# Patient Record
Sex: FEMALE | Race: WHITE | URBAN - METROPOLITAN AREA
[De-identification: names, ages, dates, MRNs, and addresses within clinical notes are randomized per-mention and may not be internally consistent; named-entity substitution may affect disease eponyms.]

---

## 2021-02-19 ENCOUNTER — OFFICE VISIT (OUTPATIENT)
Dept: NEUROLOGY | Age: 86
End: 2021-02-19
Payer: MEDICARE

## 2021-02-19 VITALS — WEIGHT: 112.2 LBS | DIASTOLIC BLOOD PRESSURE: 64 MMHG | HEART RATE: 52 BPM | SYSTOLIC BLOOD PRESSURE: 110 MMHG

## 2021-02-19 DIAGNOSIS — R27.0 ATAXIA: ICD-10-CM

## 2021-02-19 DIAGNOSIS — R55 SYNCOPE AND COLLAPSE: ICD-10-CM

## 2021-02-19 DIAGNOSIS — G45.9 TRANSIENT ISCHEMIC ATTACK: Primary | ICD-10-CM

## 2021-02-19 PROBLEM — Z87.09 HISTORY OF RESPIRATORY SYSTEM DISEASE: Status: ACTIVE | Noted: 2021-02-19

## 2021-02-19 PROBLEM — H61.20 IMPACTED CERUMEN: Status: ACTIVE | Noted: 2021-02-19

## 2021-02-19 PROBLEM — S01.81XA FACIAL LACERATION: Status: ACTIVE | Noted: 2021-02-19

## 2021-02-19 PROBLEM — I25.10 CORONARY ARTERIOSCLEROSIS: Status: ACTIVE | Noted: 2021-02-19

## 2021-02-19 PROBLEM — Z86.79 HISTORY OF HYPOTENSION: Status: ACTIVE | Noted: 2021-02-19

## 2021-02-19 PROBLEM — I10 HYPERTENSION: Status: ACTIVE | Noted: 2018-03-14

## 2021-02-19 PROBLEM — Z87.898 HISTORY OF HEADACHE: Status: ACTIVE | Noted: 2021-02-19

## 2021-02-19 PROBLEM — R01.1 HEART MURMUR: Status: ACTIVE | Noted: 2021-02-19

## 2021-02-19 PROBLEM — H91.90 HEARING LOSS: Status: ACTIVE | Noted: 2021-02-19

## 2021-02-19 PROBLEM — Z87.828 HISTORY OF HEAD INJURY: Status: ACTIVE | Noted: 2021-02-19
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
## 2021-02-19 PROBLEM — E03.9 HYPOTHYROIDISM: Status: ACTIVE | Noted: 2021-02-19

## 2021-02-19 PROBLEM — S61.209A: Status: ACTIVE | Noted: 2021-02-19

## 2021-02-19 PROBLEM — R00.1 BRADYCARDIA: Status: ACTIVE | Noted: 2021-02-19
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
## 2021-02-19 PROBLEM — S63.259A: Status: ACTIVE | Noted: 2021-02-19

## 2021-02-19 PROCEDURE — 99204 OFFICE O/P NEW MOD 45 MIN: CPT | Performed by: PSYCHIATRY & NEUROLOGY

## 2021-02-19 PROCEDURE — G8427 DOCREV CUR MEDS BY ELIG CLIN: HCPCS | Performed by: PSYCHIATRY & NEUROLOGY

## 2021-02-19 PROCEDURE — G8484 FLU IMMUNIZE NO ADMIN: HCPCS | Performed by: PSYCHIATRY & NEUROLOGY

## 2021-02-19 PROCEDURE — G8421 BMI NOT CALCULATED: HCPCS | Performed by: PSYCHIATRY & NEUROLOGY

## 2021-02-19 PROCEDURE — 1123F ACP DISCUSS/DSCN MKR DOCD: CPT | Performed by: PSYCHIATRY & NEUROLOGY

## 2021-02-19 PROCEDURE — 4040F PNEUMOC VAC/ADMIN/RCVD: CPT | Performed by: PSYCHIATRY & NEUROLOGY

## 2021-02-19 PROCEDURE — 1090F PRES/ABSN URINE INCON ASSESS: CPT | Performed by: PSYCHIATRY & NEUROLOGY

## 2021-02-19 PROCEDURE — 1036F TOBACCO NON-USER: CPT | Performed by: PSYCHIATRY & NEUROLOGY

## 2021-02-19 RX ORDER — AMLODIPINE BESYLATE 2.5 MG/1
TABLET ORAL
COMMUNITY
Start: 2020-12-27

## 2021-02-19 RX ORDER — LEVOTHYROXINE SODIUM 88 UG/1
TABLET ORAL
COMMUNITY
Start: 2020-12-27

## 2021-02-19 RX ORDER — ASPIRIN 81 MG/1
81 TABLET, CHEWABLE ORAL SEE ADMIN INSTRUCTIONS
COMMUNITY

## 2021-02-19 RX ORDER — ATORVASTATIN CALCIUM 10 MG/1
10 TABLET, FILM COATED ORAL DAILY
Qty: 30 TABLET | Refills: 3 | Status: SHIPPED | OUTPATIENT
Start: 2021-02-19

## 2021-02-19 RX ORDER — CLOPIDOGREL BISULFATE 75 MG/1
75 TABLET ORAL DAILY
Qty: 30 TABLET | Refills: 3 | Status: SHIPPED | OUTPATIENT
Start: 2021-02-19

## 2021-02-19 ASSESSMENT — ENCOUNTER SYMPTOMS
BACK PAIN: 0
NAUSEA: 0
CHOKING: 0
SHORTNESS OF BREATH: 0
TROUBLE SWALLOWING: 0
PHOTOPHOBIA: 0
VOMITING: 0
COLOR CHANGE: 0

## 2021-02-19 NOTE — PROGRESS NOTES
Subjective:      Patient ID: Livier Oliveros is a 80 y.o. female who presents today for:  Chief Complaint   Patient presents with    Transient Ischemic Attack     Pt is being seen for a TIA. The first one was in September, another in small one on Telma filomena, and a thrid was small as well but worse than the second one in january, she only went to the hospital for the first one. Daughter states that she does not seem to be shoing any issues going on pretaining to the TIA's. HPI 26-year-old right-handed gentleman was referred here for cerebral TIA. Patient is here with her daughter who is a retired nurse who I know well. In September had a cerebral TIA with right-sided symptoms lasting for about 10 minutes with a facial droop. She was on aspirin when this occurred. In a few weeks later she was readmitted with another episode which are somewhat subtle and lasted 10 minutes again on the same side and was on aspirin at that time. Aspirin was then discontinued and she had a third spell but very subtle again did not last long and she was not left with any residuals. She had a complete work-up at [facility] and her parameters were all negative she had carotid studies as well. She does have elevated lipids I do not have any labs though this is reported to me she does not like to take cholesterol-lowering pills she usually does not like to take pills. She lives alone and still manages well with the family help. She is having difficulty hearing and she sets a few auditory hallucinations of no concern. Her vision has been good. She still walks around does not like to use the cane and she shuffles but has no parkinsonian features. This shuffle is that of fear of falls. Denies any other symptoms  Patient has had a few infrequent syncopal episodes and has been seen by Dr. Prabhakar Parks.  No past medical history on file. No past surgical history on file.   Social History     Socioeconomic History  Marital status: Not on file     Spouse name: Not on file    Number of children: Not on file    Years of education: Not on file    Highest education level: Not on file   Occupational History    Not on file   Social Needs    Financial resource strain: Not on file    Food insecurity     Worry: Not on file     Inability: Not on file    Transportation needs     Medical: Not on file     Non-medical: Not on file   Tobacco Use    Smoking status: Never Smoker    Smokeless tobacco: Never Used   Substance and Sexual Activity    Alcohol use: Not Currently    Drug use: Not Currently    Sexual activity: Not on file   Lifestyle    Physical activity     Days per week: Not on file     Minutes per session: Not on file    Stress: Not on file   Relationships    Social connections     Talks on phone: Not on file     Gets together: Not on file     Attends [employer] service: Not on file     Active member of club or organization: Not on file     Attends meetings of clubs or organizations: Not on file     Relationship status: Not on file    Intimate partner violence     Fear of current or ex partner: Not on file     Emotionally abused: Not on file     Physically abused: Not on file     Forced sexual activity: Not on file   Other Topics Concern    Not on file   Social History Narrative    Not on file     No family history on file. No Known Allergies    Current Outpatient Medications   Medication Sig Dispense Refill    aspirin 81 MG chewable tablet Take 81 mg by mouth See Admin Instructions One tablet twice a week.  clopidogrel (PLAVIX) 75 MG tablet Take 1 tablet by mouth daily 30 tablet 3    atorvastatin (LIPITOR) 10 MG tablet Take 1 tablet by mouth daily 30 tablet 3    amLODIPine (NORVASC) 2.5 MG tablet TAKE 1 TABLET BY MOUTH DAILY      levothyroxine (SYNTHROID) 88 MCG tablet TAKE 1 TABLET DAILY. No current facility-administered medications for this visit.           Review of Systems   Constitutional: Negative for fever. HENT: Negative for ear pain, tinnitus and trouble swallowing. Eyes: Negative for photophobia and visual disturbance. Respiratory: Negative for choking and shortness of breath. Cardiovascular: Negative for chest pain and palpitations. Gastrointestinal: Negative for nausea and vomiting. Musculoskeletal: Negative for back pain, gait problem, joint swelling, myalgias, neck pain and neck stiffness. Skin: Negative for color change. Allergic/Immunologic: Negative for food allergies. Neurological: Negative for dizziness, tremors, seizures, syncope, facial asymmetry, speech difficulty, weakness, light-headedness, numbness and headaches. Psychiatric/Behavioral: Negative for behavioral problems, confusion, hallucinations and sleep disturbance. Objective:   /64 (Site: Left Upper Arm, Position: Sitting, Cuff Size: Medium Adult)   Pulse 52   Wt 112 lb 3.2 oz (50.9 kg)     Physical Exam  Vitals signs reviewed. Eyes:      Pupils: Pupils are equal, round, and reactive to light. Neck:      Musculoskeletal: Normal range of motion. Cardiovascular:      Rate and Rhythm: Normal rate and regular rhythm. Heart sounds: No murmur. Pulmonary:      Effort: Pulmonary effort is normal.      Breath sounds: Normal breath sounds. Abdominal:      General: Bowel sounds are normal.   Musculoskeletal: Normal range of motion. Skin:     General: Skin is warm. Neurological:      Mental Status: She is alert and oriented to person, place, and time. Cranial Nerves: No cranial nerve deficit. Sensory: No sensory deficit. Motor: No abnormal muscle tone. Coordination: Coordination normal.      Deep Tendon Reflexes: Reflexes are normal and symmetric. Babinski sign absent on the right side. Babinski sign absent on the left side. Psychiatric:         Mood and Affect: Mood normal.     She is thinly built and feeble.   She has difficulty hearing but is able to get by with my examination. She gets up from the wheelchair with some support and tends to shuffle. She does have normal arm swings though and she walks unaided. No carotid bruits are noted    Patient was never admitted. No results found for: WBC, RBC, HGB, HCT, MCV, MCH, MCHC, RDW, PLT, MPV  No results found for: NA, K, CL, CO2, BUN, CREATININE, GFRAA, AGRATIO, LABGLOM, GLUCOSE, PROT, LABALBU, CALCIUM, BILITOT, ALKPHOS, AST, ALT  No results found for: PROTIME, INR  No results found for: TSH, [id number], FOLATE, FERRITIN, IRON, TIBC, PTRFSAT, TSH, FREET4  No results found for: TRIG, HDL, LDLCALC, LDLDIRECT, LABVLDL  No results found for: LABAMPH, BARBSCNU, LABBENZ, CANNAB, COCAINESCRN, LABMETH, OPIATESCREENURINE, PHENCYCLIDINESCREENURINE, PPXUR, ETOH  No results found for: LITHIUM, DILFRTOT, VALPROATE    Assessment:       Diagnosis Orders   1. Transient ischemic attack     2. Syncope and collapse     3. Ataxia       Recurrent transient ischemic event with right-sided findings. 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 I recommended that we discontinue the aspirin and only consider her for Plavix. This would be better from the GI standpoint as well. Will watch for bruising. Patient's risk factor may include hypercholesterolemia and a very low-dose of Lipitor such as 10 mg would be beneficial.  We did discuss this with her and she is now agreeable as a stroke would be detrimental.  Patient's gait ataxia is a fear of fall and I do not see any parkinsonian features. There is no myelopathic signs    Patient has history of syncopal episodes and I am not quite sure if her episodes occur in face of hypotension as this then would become a hypotensive TIAs. We will watch her and the daughter will call me if there are any changes otherwise I will follow her as needed please call me with any questions    Wood Bolivar MD, [id number], American Board of Psychiatry & Neurology  Board Certified in Vascular Neurology  Board Certified in Neuromuscular Medicine  Certified in [address] [place] [address] Ave:      No orders of the defined types were placed in this encounter. Orders Placed This Encounter   Medications    clopidogrel (PLAVIX) 75 MG tablet     Sig: Take 1 tablet by mouth daily     Dispense:  30 tablet     Refill:  3    atorvastatin (LIPITOR) 10 MG tablet     Sig: Take 1 tablet by mouth daily     Dispense:  30 tablet     Refill:  3       No follow-ups on file.       Ana Payne MD